# Patient Record
Sex: MALE | Race: OTHER | Employment: OTHER | URBAN - METROPOLITAN AREA
[De-identification: names, ages, dates, MRNs, and addresses within clinical notes are randomized per-mention and may not be internally consistent; named-entity substitution may affect disease eponyms.]

---

## 2022-04-14 ENCOUNTER — OFFICE VISIT (OUTPATIENT)
Dept: CARDIOLOGY CLINIC | Age: 72
End: 2022-04-14
Payer: MEDICARE

## 2022-04-14 VITALS
WEIGHT: 151 LBS | HEART RATE: 78 BPM | OXYGEN SATURATION: 98 % | DIASTOLIC BLOOD PRESSURE: 64 MMHG | BODY MASS INDEX: 24.27 KG/M2 | HEIGHT: 66 IN | SYSTOLIC BLOOD PRESSURE: 114 MMHG

## 2022-04-14 DIAGNOSIS — R94.31 ABNORMAL EKG: ICD-10-CM

## 2022-04-14 DIAGNOSIS — Z82.49 FAMILY HISTORY OF HEART DISEASE: ICD-10-CM

## 2022-04-14 DIAGNOSIS — I10 HYPERTENSION, UNSPECIFIED TYPE: Primary | ICD-10-CM

## 2022-04-14 DIAGNOSIS — R01.1 SYSTOLIC MURMUR: ICD-10-CM

## 2022-04-14 DIAGNOSIS — E11.9 CONTROLLED TYPE 2 DIABETES MELLITUS WITHOUT COMPLICATION, WITHOUT LONG-TERM CURRENT USE OF INSULIN (HCC): ICD-10-CM

## 2022-04-14 DIAGNOSIS — E78.5 HYPERLIPIDEMIA, UNSPECIFIED HYPERLIPIDEMIA TYPE: ICD-10-CM

## 2022-04-14 PROCEDURE — 99205 OFFICE O/P NEW HI 60 MIN: CPT | Performed by: INTERNAL MEDICINE

## 2022-04-14 PROCEDURE — 93000 ELECTROCARDIOGRAM COMPLETE: CPT | Performed by: INTERNAL MEDICINE

## 2022-04-14 RX ORDER — LISINOPRIL 20 MG/1
20 TABLET ORAL DAILY
COMMUNITY

## 2022-04-14 RX ORDER — LORAZEPAM 0.5 MG/1
0.5 TABLET ORAL
COMMUNITY

## 2022-04-14 RX ORDER — ROSUVASTATIN CALCIUM 20 MG/1
20 TABLET, COATED ORAL DAILY
Qty: 30 TABLET | Refills: 3
Start: 2022-04-14

## 2022-04-14 NOTE — PATIENT INSTRUCTIONS
1) Stress Test and Echocardiogram (US of the heart)  2) We will call you within 1-2 days after the testing is complete with the results  3) We will see you in follow up as needed per Dr. Lawanda Caldera instructions. Patient Education        Cardiac Perfusion Scan: About This Test  What is it? A cardiac perfusion scan measures the amount of blood in your heart muscle at rest and after your heart has been made to work hard. Medicine or exercise canbe used to increase the amount of blood that your heart needs. During the scan, a camera takes pictures of your heart after a radioactive tracer is put into a vein in your arm. The tracer travels through the blood and into your heart. As the tracer moves through your heart, areas that have good blood flow absorb the tracer. Areas that don't absorb the tracer may not be getting enough blood or may have been damaged by a heart attack. The picturesshow the difference. Two sets of pictures may be made during the test. One set is taken while you are resting. Another set is taken after your heart has been made to work harder(called a stress test). Why is this test done? The test is often done to find out what may be causing chest pain or pressure. It may be done after a heart attack to see if areas of the heart aren't getting enough blood. It also may be used to find out how much your heart has beendamaged from the heart attack. How do you prepare for the test?  Tell your doctor ALL the medicines, vitamins, supplements, and herbal remedies you take. Some may increase the risk of problems during your test. Your doctor will tell you if you should stop taking any of them before the test and howsoon to do it. If you take aspirin or some other blood thinner, ask your doctor if you should stop taking it before your test. Make sure that you understand exactly whatyour doctor wants you to do. These medicines increase the risk of bleeding.   You may be told not to eat or drink for several hours before the scan. You may be told to avoid alcohol, tobacco, and drinks that have caffeine for at least24 hours before the test.  Wear comfortable shoes, such as running shoes, and loose shorts or pants. Don'twear jewelry to the test.  If you are breastfeeding, you may want to pump enough breast milk before the test to get through 1 to 2 days of feeding. The radioactive tracer used in thistest can get into your breast milk and is not good for the baby. How is the test done? A cardiac perfusion test can be done while you're resting, after you exercise, or after you take medicine. Or you could have the test after taking medicineand exercising. Before the scans, electrodes will be attached to your chest to help record your heartbeats. You will have a tube, called an IV, put into your arm. Radioactivetracer will be put in the IV.  For the resting scan, you will lie on a table. A camera above your chest records the tracer that has moved from your blood into your heart muscle. Several scans will be taken. They take 10 to 30 minutes each.  For an exercise scan, you will walk on a treadmill or pedal a stationary bike. Then you have the scan.  For a medicine scan, you are given a medicine in your IV that increases the amount of blood that your heart needs. Then you have the scan. How long does a cardiac perfusion scan take?  Each scan may take about 30 to 60 minutes.  How long the test takes will depend on how many scans you have and how long you wait between scans. What are the risks of a cardiac perfusion scan? Cardiac perfusion scans are usually safe. Anytime you're exposed to radiation, there's a small chance of damage to cells or tissue. That's the case even with the low-level radioactive tracer used for this test. But the chance of damage is very low compared with the benefits ofthe test.  There will be some risks when the test uses exercise or medicine to stress your heart.  The amount of risk depends on the condition of your heart and yourgeneral level of health. The risks include:   Fainting.  Chest pain.  An irregular heartbeat.  Heart attack. There is a slight risk that death may result if a heart attack occurs during the test.  What happens after the test?  You can go home and back to your usual activities right away, unless you arealready admitted to the hospital.  How can you care for yourself at home?  Drink plenty of fluids for the next 24 hours to help flush the tracer out of your body. If you have kidney, heart, or liver disease and have to limit fluids, talk with your doctor before you increase the amount of fluids you drink.  Most of the tracer will leave your body through your urine or stool within a day. So be sure to flush the toilet right after you use it, and wash your hands well with soap and water. The amount of radiation in the tracer is very small. This means it isn't a risk for people to be around you after the test.   Do not breastfeed your baby for 1 or 2 days after this test. During this time, you can give your baby breast milk you stored before the test, or you can give formula. When should you call for help? Call 911 anytime you think you may need emergency care. For example, call if:   You passed out (lost consciousness).  You have been diagnosed with angina, and you have angina symptoms that do not go away with rest or are not getting better within 5 minutes after you take a dose of nitroglycerin.  You have symptoms of a heart attack. These may include:  ? Chest pain or pressure, or a strange feeling in the chest.  ? Sweating. ? Shortness of breath. ? Nausea or vomiting. ? Pain, pressure, or a strange feeling in the back, neck, jaw, or upper belly or in one or both shoulders or arms. ? Lightheadedness or sudden weakness. ? A fast or irregular heartbeat.   After you call 911, the  may tell you to chew 1 adult-strength or 2 to 4 low-dose aspirin. Wait for an ambulance. Do not try to drive yourself. Watch closely for changes in your health, and be sure to contact your doctor ifyou have any problems. Follow-up care is a key part of your treatment and safety. Be sure to make and go to all appointments, and call your doctor if you are having problems. It's also a good idea to keep a list of the medicines youtake. Ask your doctor when you can expect to have your test results. Where can you learn more? Go to [email or web address].[email or web address] org and sign in to your [employer] account. Enter T239 in the [employer] box to learn more about \"Cardiac Perfusion Scan: About This Test.\"     If you do not have an account, please click on the \"Sign Up Now\" link. Current as of: January 10, 2022               Content Version: 13.2  © 2006-2022 [employer]. Care instructions adapted under license by [facility] ([facility]). If you have questions about a medical condition or this instruction, always ask your healthcare professional. [address] any warranty or liability for your use of this information. Patient Education        Transthoracic Echocardiogram: About This Test  What is it? An echocardiogram (also called an echo) uses sound waves to make an image of your heart. A device called a transducer sends sound waves that echo off your heart and back to the transducer. These echoes are turned into moving picturesof your heart that can be seen on a video screen. In a transthoracic echocardiogram (TTE), the transducer is moved across yourchest or belly. A TTE is the most common type of echocardiogram.  Why is this test done? This test is done to check your heart health. It's used for many reasons. Forexample, it may be done to:   Check a heart murmur.  Look for the cause of shortness of breath or unexplained chest pain or pressure.  Check how well your heart is pumping blood.    Check to see how well your heart valves are working.  Look for blood clots inside your heart.  Measure the size and shape of the heart's chambers.  Measure the blood pressure and speed of blood flow through the heart. How is the test done?  You may be asked to remove your clothes above your waist. You may be given a cloth or paper covering to use during the test.   You will lie on your back or on your left side on a bed or table.  You may receive medicine through a vein (intravenously, or IV). The IV can be used to give you a contrast material. This helps your doctor get good views of your heart.  Small pads or patches (electrodes) will be placed on the skin of your chest to record your heart rate during the test.   A small amount of gel will be rubbed on the side of your chest to help  the sound waves.  The transducer will be pressed firmly against your chest and moved slowly back and forth. It is usually moved to different areas on your chest or belly to get specific views of your heart.  You will be asked to do several things, such as hold very still, breathe in and out very slowly, hold your breath, or lie on your left side.  When the test is over, the gel is wiped off and the electrodes are removed. What are the risks of the test?  There are no known risks from having this test.   No electricity passes through your body during the test. There is no danger of getting an electrical shock.  You do not receive any radiation. What happens after the test?   You will probably be able to go home right away. It depends on the reason for the test.   You can go back to your usual activities right away. Follow-up care is a key part of your treatment and safety. Be sure to make and go to all appointments, and call your doctor if you are having problems. It's also a good idea to keep a list of the medicines youtake. Ask your doctor when you can expect to have your test results. Where can you learn more?   Go to [email or web address] and sign in to your [employer] account. Enter T235 in the [place] box to learn more about \"Transthoracic Echocardiogram: About This Test.\"     If you do not have an account, please click on the \"Sign Up Now\" link. Current as of: January 10, 2022               Content Version: 13.2  © 0667-8458 Healthwise, [employer]. Care instructions adapted under license by [facility] ([facility] [place]). If you have questions about a medical condition or this instruction, always ask your healthcare professional. [place] [address] any warranty or liability for your use of this information.

## 2022-04-14 NOTE — PROGRESS NOTES
[address]      Patient Name:  Anitra Gabriel  Requesting Physician: No admitting provider for patient encounter. Primary Care Physician: [facility] MD    Reason for Consultation/Chief Complaint: Preventive cardiac evaluation    Reason for Referral: family hx of heart disease. CC: \"I have no heart symptoms but my family had heart disease. \"     History of Present Illness:    Anitra Gabriel is a 73 y.o. patient presenting today for preventive care. The patient's risk factors for cardiac disease include the following:  Hypertension  Hyperlipidemia  Diabetes mellitus  Family history of CAD     The patient is 73 y.o. male with a past medical history significant for DM, HTN, HLD with family hx of heart disease mother and brother. He is accompanied by family member today. He currently denies any cardiac sounding complaints. He is able to perform all daily activities without any limitations. He walks 10,000 steps per day, lifts weights daily, able to go up and down the stairs 10x without any symptoms with up to 2 hours of daily exercise. He reports his DM, HTN and HLD with medication compliance and tolerating. Shaaron Money He goes for routine eye exams with no reported DM, HTN complications. He denies any cramping in legs with walking or neuropathy. No hx of smoking cigarettes. Social ETOH use. No hx of surgery. No allergies. Patient denies exertional chest pain/pressure, dyspnea at rest, RANDALL, PND, orthopnea, palpitations, lightheadedness, weight changes, changes in LE edema, and syncope. The patient endorses highest level of activity as walking, weight lifting, active daily. Past Medical History:   has a past medical history of Diabetes mellitus ([address] Hyperlipidemia, and Hypertension. Surgical History:   has a past surgical history that includes Refractive surgery (1996). Social History:   reports that he has never smoked.  He has never used smokeless tobacco. He reports current alcohol use. He reports that he does not use drugs. Family History:  family history includes Diabetes in his brother and mother; Heart Disease in his mother; Heart Disease (age of onset: 72) in his brother. Current Medications:  Current Outpatient Medications on File Prior to Visit   Medication Sig Dispense Refill    lisinopril (PRINIVIL;ZESTRIL) 20 MG tablet Take 20 mg by mouth daily      LORazepam (ATIVAN) 0.5 MG tablet Take 0.5 mg by mouth nightly as needed (sleep).  metformin (GLUCOPHAGE) 1000 MG tablet Take 1 tablet by mouth 2 times daily (with meals). 60 tablet 6    glyBURIDE (DIABETA) 5 MG tablet Take 1 tablet by mouth 2 times daily (with meals). 180 tablet 3     No current facility-administered medications on file prior to visit. Allergies:  Patient has no known allergies. Review of Systems:  · Constitutional: no unanticipated weight loss. There's been no change in energy level, sleep pattern, or activity level. No fevers, chills. · Eyes: No visual changes or diplopia. No scleral icterus. · ENT: No Headaches, hearing loss or vertigo. No mouth sores or sore throat. · Cardiovascular: as reviewed in HPI  · Respiratory: No cough or wheezing, no sputum production. No hematemesis. · Gastrointestinal: No abdominal pain, appetite loss, blood in stools. No change in bowel or bladder habits. · Genitourinary: No dysuria, trouble voiding, or hematuria. · Musculoskeletal:  No gait disturbance, no joint complaints. · Integumentary: No rash or pruritis. · Neurological: No headache, diplopia, change in muscle strength, numbness or tingling. · Psychiatric: No anxiety or depression. · Endocrine: No temperature intolerance. No excessive thirst, fluid intake, or urination. No tremor. · Hematologic/Lymphatic: No abnormal bruising or bleeding, blood clots or swollen lymph nodes. · Allergic/Immunologic: No nasal congestion or hives.       Objective:  Vitals: 04/14/22 1432   BP: 114/64   Pulse: 78   SpO2: 98%   Weight: 151 lb (68.5 kg)   Height: 5' 6\" (1.676 m)      Weight: 151 lb (68.5 kg)         PHYSICAL EXAM:      General:  Alert, cooperative, no distress, appears stated age   Head:  Normocephalic, atraumatic   Eyes:  Conjunctiva/corneas clear, anicteric sclerae    Nose: Nares normal, no drainage or sinus tenderness   Throat: No abnormalities of the lips, oral mucosa or tongue. Neck: Trachea midline. Neck supple with no lymphadenopathy, thyroid not enlarged, symmetric, no tenderness/mass/nodules, no Jugular venous pressure elevation    Lungs:   Clear to auscultation bilaterally, no wheezes, no rales, no respiratory distress   Chest Wall:  No deformity or tenderness to palpation   Heart:  Regular rate and rhythm, normal S1, normal S2, soft systolic murmur, no rub, no S3/S4, PMI non-displaced. Abdomen:   Soft, non-tender, with normoactive bowel sounds. No masses, no hepatosplenomegaly   Extremities: No cyanosis, clubbing or pitting edema. Vascular: 2+ radial, 2+ brachial, femoral, 2+ dorsalis pedis and posterior tibial pulses bilaterally. Brisk carotid upstrokes without carotid bruit. Skin: Skin color, texture, turgor are normal with no rashes or ulceration. Psych: Euthymic mood, appropriate affect   Neurologic: Oriented to person, place and time. No slurred speech or facial asymmetry. No motor or sensory deficits on gross examination. Labs: Care Everywhere 6/2021      Cardiovascular Data:EKG: Personally reviewed with my interpretation: 4/14/2022 Sinus  Rhythm, -  Nonspecific T-abnormality. -samll q waves in inf leads. ~ 78 bpm    Coronary calcium score:No results found for this or any previous visit. Ankle-brachial index:No results found for this or any previous visit. Carotid ultrasound screening:No results found for this or any previous visit.     Abdominal aortic aneurysm screening: No results found for this or any previous visit. The ASCVD Risk score (Dayana Reyes., et al., 2013) failed to calculate for the following reasons:    Cannot find a previous HDL lab    Cannot find a previous total cholesterol lab    Unable to determine if patient is Non-      In adults at borderline risk (5% to <7.5% 10-year ASCVD risk) or intermediate risk (7.5% to <20% 10-year ASCVD risk), the following risk-enhancing factors and/or testing has been reviewed:        Josh et al. Circulation 2019     High-Intensity Moderate-Intensity    Percent LDL-C Reduction ³50% ³30-49%   Primary statins Atorvastatin 40-80 mg Atorvastatin 10-20 mg    Rosuvastatin 20-40 mg Rosuvastatin 5-10 mg   Secondary statins   Pravastatin 40-80 mg     Simvastatin 20-40 mg     Impression and Plan:     Preventive cardiac evaluation   Hypertension, unspecified type  -     EKG 12 lead  Hyperlipidemia, unspecified hyperlipidemia type  Controlled type 2 diabetes mellitus without complication, without long-term current use of insulin (HCC)  Family history of heart disease  Systolic murmur    Presents today to establish care. He has a past medical history significant for DM, HTN, HLD with family hx of heart disease mother and brother. He is accompanied by family member today. He currently denies any cardiac sounding complaints. He is able to perform all daily activities without any limitations. He walks 10,000 steps per day, lifts weights daily, able to go up and down the stairs 10x without any symptoms for up to 2 hours daily. He reports his DM, HTN and HLD with medication compliance and tolerating. Carmel Hilt He goes for routine eye exams with no reported DM, HTN complications. He denies any cramping in legs with walking. No hx of smoking cigarettes. Social ETOH use. No hx of surgery. No allergies. Patient denies exertional chest pain/pressure, dyspnea at rest, RANDALL, PND, orthopnea, palpitations, lightheadedness, weight changes, changes in LE edema, and syncope.  Labs completed per [facility] 12/9/2021, 6/2021 LDLc 62, H/H stable, BMP stable, A1c 8.1 12/9/21. EKG today Sinus  Rhythm, -  Nonspecific T-abnormality. -samll q waves in inf leads. ~ 78 bpm. His physical exam revealed soft systolic murmur. PLAN:    1. I would like to proceed with a treadmill perfusion myoview to rule out silent progressive atherosclerotic heart disease since he has a very strong family history, history of diabetes, hyperlipidemia and borderline hypertension  2. We will obtain, echocardiogram to assess for  to rule out structural and valvular heart disease. 3. I would like for him to continue medical therapy with Lisinopril, Crestor, metformin and Diabeta. 4. Discussed life time risk factor modifications, routine labs per PCP. 5. We encouraged modest weight loss/management through implementing appropriate dietary measures as well as initiation of a graded exercise program with the ultimate goal of 150 minutes of aerobic exercise weekly. I will address the patient's cardiac risk factors and adjusted pharmacologic treatment as needed. In addition, I have reinforced the need for patient directed risk factor modification. All questions and concerns were addressed to the patient/family. Alternatives to my treatment were discussed. Patient verbalized understanding. We will call him with all test results and plan follow up as needed accordingly. Thank you for allowing us to participate in the care of this patient. This note was scribed in the presence of Dr. Queta Mina MD by Calin Lopez RN.

## 2022-04-22 ENCOUNTER — HOSPITAL ENCOUNTER (OUTPATIENT)
Dept: NON INVASIVE DIAGNOSTICS | Age: 72
Discharge: HOME | End: 2022-04-22
Payer: MEDICARE

## 2022-04-22 ENCOUNTER — TELEPHONE (OUTPATIENT)
Dept: CARDIOLOGY CLINIC | Age: 72
End: 2022-04-22

## 2022-04-22 DIAGNOSIS — I10 HYPERTENSION, UNSPECIFIED TYPE: ICD-10-CM

## 2022-04-22 DIAGNOSIS — Z82.49 FAMILY HISTORY OF HEART DISEASE: ICD-10-CM

## 2022-04-22 DIAGNOSIS — E78.5 HYPERLIPIDEMIA, UNSPECIFIED HYPERLIPIDEMIA TYPE: ICD-10-CM

## 2022-04-22 DIAGNOSIS — R01.1 SYSTOLIC MURMUR: ICD-10-CM

## 2022-04-22 DIAGNOSIS — E11.9 CONTROLLED TYPE 2 DIABETES MELLITUS WITHOUT COMPLICATION, WITHOUT LONG-TERM CURRENT USE OF INSULIN (HCC): ICD-10-CM

## 2022-04-22 DIAGNOSIS — R94.39 ABNORMAL STRESS TEST: Primary | ICD-10-CM

## 2022-04-22 DIAGNOSIS — R94.31 ABNORMAL EKG: ICD-10-CM

## 2022-04-22 LAB
LV EF: 58 %
LV EF: 73 %
LVEF MODALITY: NORMAL
LVEF MODALITY: NORMAL

## 2022-04-22 PROCEDURE — A9502 TC99M TETROFOSMIN: HCPCS | Performed by: INTERNAL MEDICINE

## 2022-04-22 PROCEDURE — 3430000000 HC RX DIAGNOSTIC RADIOPHARMACEUTICAL: Performed by: INTERNAL MEDICINE

## 2022-04-22 PROCEDURE — 93306 TTE W/DOPPLER COMPLETE: CPT

## 2022-04-22 PROCEDURE — 78452 HT MUSCLE IMAGE SPECT MULT: CPT

## 2022-04-22 PROCEDURE — 93017 CV STRESS TEST TRACING ONLY: CPT

## 2022-04-22 RX ADMIN — TETROFOSMIN 30 MILLICURIE: 1.38 INJECTION, POWDER, LYOPHILIZED, FOR SOLUTION INTRAVENOUS at 12:53

## 2022-04-22 RX ADMIN — TETROFOSMIN 10 MILLICURIE: 1.38 INJECTION, POWDER, LYOPHILIZED, FOR SOLUTION INTRAVENOUS at 11:42

## 2022-04-22 NOTE — TELEPHONE ENCOUNTER
Pt is agreeable to date/time. Went over pre-procedure instructions. Pt v/u. Published on navigaya and e-mail to [place]. Procedure is scheduled for 4/28/22 at 8:30 am with a 7 am arrival.     The morning of your procedure you will park in the hospital parking lot and report directly to the cath lab to check in.     Pre-Procedure Instructions     You will need to fast for at least 8 hours prior to procedure. No caffeine the morning of. Hold all diabetic medications including, Metfomin. If you take Lantus/Levemir only take ½ your normal dose the evening before. All other medications can be taken in the morning with sips of water. You will need to take 325 mg aspirin the morning of. If you are currently taking 81 mg please take 4 tablets that morning. Do not use any lotions, creams or perfume the morning of procedure. Pre-procedure lab work will need to be completed 5-7 days prior to procedure. Please have a responsible adult to drive you home after procedure. We advise you have someone to stay with you for 24 hours following procedure for precautionary measures. Depending on procedure you may require an overnight stay. Cath lab will provide you with all post procedure instructions. If you have any questions regarding the procedure itself or medications, please call [phone] and ask to speak with a nurse.

## 2022-04-25 LAB
ANION GAP SERPL CALCULATED.3IONS-SCNC: 12 MMOL/L (ref 7–16)
BUN BLDV-MCNC: 11 MG/DL (ref 8–23)
CALCIUM SERPL-MCNC: 9.2 MG/DL (ref 8.8–10.4)
CHLORIDE BLD-SCNC: 105 MEQ/L (ref 98–107)
CO2: 26 MMOL/L (ref 22–29)
CREAT SERPL-MCNC: 0.87 MG/DL (ref 0.67–1.3)
EGFR (CKD-EPI): 93 ML/MIN/1.73 M2
GLUCOSE BLD-MCNC: 117 MG/DL (ref 82–100)
HCT VFR BLD CALC: 41.2 % (ref 40–51)
HEMOGLOBIN: 12.9 G/DL (ref 13.7–17.5)
MCH RBC QN AUTO: 28.5 PG (ref 26–34)
MCHC RBC AUTO-ENTMCNC: 31.3 G/DL (ref 30.7–35.5)
MCV RBC AUTO: 90.9 FL (ref 80–100)
NRBC ABSOLUTE: 0 X10(3)/MCL
NUCLEATED RED BLOOD CELLS: 0.2 %
PDW BLD-RTO: 13.2 %
PLATELET # BLD: 248 X10(3)/MCL (ref 155–369)
PMV BLD AUTO: 11.5 FL (ref 8.8–12.5)
POTASSIUM SERPL-SCNC: 4.9 MEQ/L (ref 3.5–5)
RBC # BLD: 4.53 X10(6)/MCL (ref 4.6–6.1)
SODIUM BLD-SCNC: 143 MEQ/L (ref 136–145)
WBC # BLD: 8.3 X10(3)/MCL (ref 3.7–10.3)

## 2022-04-25 NOTE — TELEPHONE ENCOUNTER
Pt is rescheduled on 4/28/22 to 10:30 with an arrival time of 9 am. Pt is agreeable to date/time. Went over pre-procedure instructions again.  Pt v/u

## 2022-04-28 ENCOUNTER — HOSPITAL ENCOUNTER (OUTPATIENT)
Dept: CARDIAC CATH/INVASIVE PROCEDURES | Age: 72
Discharge: HOME | End: 2022-04-28
Payer: MEDICARE

## 2022-04-28 VITALS
HEIGHT: 65 IN | HEART RATE: 72 BPM | TEMPERATURE: 97.2 F | SYSTOLIC BLOOD PRESSURE: 161 MMHG | DIASTOLIC BLOOD PRESSURE: 69 MMHG | WEIGHT: 149 LBS | BODY MASS INDEX: 24.83 KG/M2 | OXYGEN SATURATION: 100 % | RESPIRATION RATE: 12 BRPM

## 2022-04-28 LAB
LEFT VENTRICULAR EJECTION FRACTION MODE: NORMAL
LEFT VENTRICULAR EJECTION FRACTION MODE: NORMAL
LV EF: 60 %

## 2022-04-28 PROCEDURE — C1894 INTRO/SHEATH, NON-LASER: HCPCS

## 2022-04-28 PROCEDURE — 6360000004 HC RX CONTRAST MEDICATION: Performed by: INTERNAL MEDICINE

## 2022-04-28 PROCEDURE — 2500000003 HC RX 250 WO HCPCS

## 2022-04-28 PROCEDURE — 6370000000 HC RX 637 (ALT 250 FOR IP)

## 2022-04-28 PROCEDURE — 2580000003 HC RX 258

## 2022-04-28 PROCEDURE — 99152 MOD SED SAME PHYS/QHP 5/>YRS: CPT

## 2022-04-28 PROCEDURE — 99153 MOD SED SAME PHYS/QHP EA: CPT

## 2022-04-28 PROCEDURE — 2709999900 HC NON-CHARGEABLE SUPPLY

## 2022-04-28 PROCEDURE — 93458 L HRT ARTERY/VENTRICLE ANGIO: CPT | Performed by: INTERNAL MEDICINE

## 2022-04-28 PROCEDURE — 93458 L HRT ARTERY/VENTRICLE ANGIO: CPT

## 2022-04-28 PROCEDURE — 6360000002 HC RX W HCPCS

## 2022-04-28 PROCEDURE — C1769 GUIDE WIRE: HCPCS

## 2022-04-28 PROCEDURE — C1887 CATHETER, GUIDING: HCPCS

## 2022-04-28 PROCEDURE — 99152 MOD SED SAME PHYS/QHP 5/>YRS: CPT | Performed by: INTERNAL MEDICINE

## 2022-04-28 RX ORDER — SODIUM CHLORIDE 9 MG/ML
INJECTION, SOLUTION INTRAVENOUS PRN
Status: DISCONTINUED | OUTPATIENT
Start: 2022-04-28 | End: 2022-04-29 | Stop reason: HOSPADM

## 2022-04-28 RX ORDER — ONDANSETRON 2 MG/ML
4 INJECTION INTRAMUSCULAR; INTRAVENOUS EVERY 6 HOURS PRN
Status: DISCONTINUED | OUTPATIENT
Start: 2022-04-28 | End: 2022-04-29 | Stop reason: HOSPADM

## 2022-04-28 RX ORDER — ACETAMINOPHEN 325 MG/1
650 TABLET ORAL EVERY 4 HOURS PRN
Status: DISCONTINUED | OUTPATIENT
Start: 2022-04-28 | End: 2022-04-29 | Stop reason: HOSPADM

## 2022-04-28 RX ORDER — ASPIRIN 81 MG/1
81 TABLET ORAL DAILY
Qty: 30 TABLET | Refills: 3 | Status: SHIPPED | OUTPATIENT
Start: 2022-04-28

## 2022-04-28 RX ORDER — SODIUM CHLORIDE 0.9 % (FLUSH) 0.9 %
5-40 SYRINGE (ML) INJECTION PRN
Status: DISCONTINUED | OUTPATIENT
Start: 2022-04-28 | End: 2022-04-29 | Stop reason: HOSPADM

## 2022-04-28 RX ORDER — ASPIRIN 325 MG
325 TABLET ORAL ONCE
Status: DISCONTINUED | OUTPATIENT
Start: 2022-04-28 | End: 2022-04-29 | Stop reason: HOSPADM

## 2022-04-28 RX ORDER — SODIUM CHLORIDE 0.9 % (FLUSH) 0.9 %
5-40 SYRINGE (ML) INJECTION EVERY 12 HOURS SCHEDULED
Status: DISCONTINUED | OUTPATIENT
Start: 2022-04-28 | End: 2022-04-29 | Stop reason: HOSPADM

## 2022-04-28 RX ADMIN — IOPAMIDOL 75 ML: 755 INJECTION, SOLUTION INTRAVENOUS at 11:12

## 2022-04-28 NOTE — PROCEDURES
[address] [place] [address]                            CARDIAC CATHETERIZATION    PATIENT NAME: Tammi Luna             :        1950  MED REC NO:   [id number]                          ROOM:  ACCOUNT NO:   [de-identified]                           ADMIT DATE: 2022  PROVIDER:     Beatris Romberg, MD    DATE OF PROCEDURE:  2022    PROCEDURE NOTE:  The patient was explained benefits and risks of the  procedure. Informed consent was obtained. After performing Mauro test,  the right radial artery was punctured using Seldinger technique. A  4/5-Peruvian slender sheath was placed in the right radial artery. Coronary angiography was carried out with the help of 5-Peruvian Tiger  catheter. Multiple views of the left and the right coronary arteries  were obtained in the [place] projection with cranial and caudal  angulations. Angled pigtail catheter was used for a left  ventriculogram.  Pressure in the left ventricular cavity was obtained  before and after the left ventriculogram.  All the catheters were  removed. Coronary angiography was carefully reviewed and a wrist band  was applied for hemostasis. Estimated blood loss was less than 30 mL. HEMODYNAMIC DATA:  Please see the computerized printout. No gradient  across the aortic valve noted. CORONARY ANGIOGRAPHY:  1. Left main trunk: It arises from the left sinus of Valsalva. It  divides into a left anterior descending artery, ramus intermedius, and  left circumflex artery. Left main trunk has evidence of mild  calcification but there is no obstructive disease noted. 2.  Left anterior descending artery shows evidence of calcification in  the proximal segment. There is an eccentric nonobstructive 30% plaque  in the proximal LAD but the remainder of the LAD appears free of  atherosclerosis. 3.  Ramus intermedius:   It arises from the left main trunk. It is also  a moderate-sized artery and it is also free of any significant  atherosclerosis. 4.  Left circumflex artery arises from the left main trunk. It is a  moderate-sized artery. It gives rise to moderate-sized obtuse marginal  branch. It is free of any significant atherosclerosis. 5.  Right coronary artery: It arises from the right sinus of Valsalva. It is a moderate-sized artery. It is a dominant artery. It gives rise  to posterior descending and posterolateral branches. Right coronary  artery and its branches are free of atherosclerosis. 4.  Left ventriculogram reveals a preserved left ventricular systolic  function with estimated EF of 50% to 55%. OVERALL IMPRESSION:  1. Calcified left main trunk patent. 2.  Proximal LAD calcification with eccentric 30% LAD plaque present. 3.  Patent left circumflex artery. 4.  Patent ramus intermedius. 5.  Patent right coronary artery and its branches. 6.  Normal left ventricular systolic function with estimated EF of 50%  to 55% with normal left heart hemodynamics. In view of the above findings, we will continue the patient on the  current medicine and continue to aggressively treat his cholesterol and  work on the risk factor modifications.         Janine Lu MD    D: 04/28/2022 11:41:09       T: 04/28/2022 12:54:20     [id number]  Job#: [id number]     Doc#: [id number]    CC:  Kristina Gabriel MD       Primary Care Physician

## 2022-04-28 NOTE — H&P
History of Present Illness:     Feliberto Blackman is a 73 y.o. patient presenting today for preventive care.      The patient's risk factors for cardiac disease include the following:  Hypertension  Hyperlipidemia  Diabetes mellitus  Family history of CAD      The patient is 73 y.o. male with a past medical history significant for DM, HTN, HLD with family hx of heart disease mother and brother. He is accompanied by family member today. He currently denies any cardiac sounding complaints. He is able to perform all daily activities without any limitations. He walks 10,000 steps per day, lifts weights daily, able to go up and down the stairs 10x without any symptoms with up to 2 hours of daily exercise. He reports his DM, HTN and HLD with medication compliance and tolerating. Lois Chang He goes for routine eye exams with no reported DM, HTN complications. He denies any cramping in legs with walking or neuropathy. No hx of smoking cigarettes. Social ETOH use. No hx of surgery. No allergies. Patient denies exertional chest pain/pressure, dyspnea at rest, RANDALL, PND, orthopnea, palpitations, lightheadedness, weight changes, changes in LE edema, and syncope.        The patient endorses highest level of activity as walking, weight lifting, active daily.      Past Medical History:   has a past medical history of Diabetes mellitus ([address] Hyperlipidemia, and Hypertension.     Surgical History:   has a past surgical history that includes Refractive surgery (1996).    Social History:   reports that he has never smoked. He has never used smokeless tobacco. He reports current alcohol use.  He reports that he does not use drugs.      Family History:  family history includes Diabetes in his brother and mother; Heart Disease in his mother; Heart Disease (age of onset: 72) in his brother.     Current Medications:         Current Outpatient Medications on File Prior to Visit   Medication Sig Dispense Refill    lisinopril (PRINIVIL;ZESTRIL) 20 MG tablet Take 20 mg by mouth daily        LORazepam (ATIVAN) 0.5 MG tablet Take 0.5 mg by mouth nightly as needed (sleep).        metformin (GLUCOPHAGE) 1000 MG tablet Take 1 tablet by mouth 2 times daily (with meals). 60 tablet 6    glyBURIDE (DIABETA) 5 MG tablet Take 1 tablet by mouth 2 times daily (with meals). 180 tablet 3      No current facility-administered medications on file prior to visit.         Allergies:  Patient has no known allergies.      Review of Systems:  · Constitutional: no unanticipated weight loss. There's been no change in energy level, sleep pattern, or activity level. No fevers, chills. · Eyes: No visual changes or diplopia. No scleral icterus. · ENT: No Headaches, hearing loss or vertigo. No mouth sores or sore throat. · Cardiovascular: as reviewed in HPI  · Respiratory: No cough or wheezing, no sputum production. No hematemesis. · Gastrointestinal: No abdominal pain, appetite loss, blood in stools. No change in bowel or bladder habits. · Genitourinary: No dysuria, trouble voiding, or hematuria. · Musculoskeletal:  No gait disturbance, no joint complaints. · Integumentary: No rash or pruritis. · Neurological: No headache, diplopia, change in muscle strength, numbness or tingling. · Psychiatric: No anxiety or depression. · Endocrine: No temperature intolerance. No excessive thirst, fluid intake, or urination. No tremor. · Hematologic/Lymphatic: No abnormal bruising or bleeding, blood clots or swollen lymph nodes.   · Allergic/Immunologic: No nasal congestion or hives.        Objective:      Vitals:     04/14/22 1432   BP: 114/64   Pulse: 78   SpO2: 98%   Weight: 151 lb (68.5 kg)   Height: 5' 6\" (1.676 m)       Weight: 151 lb (68.5 kg)          PHYSICAL EXAM:       General:  Alert, cooperative, no distress, appears stated age   Head:  Normocephalic, atraumatic   Eyes:  Conjunctiva/corneas clear, anicteric sclerae    Nose: Nares normal, no drainage or sinus tenderness Throat: No abnormalities of the lips, oral mucosa or tongue. Neck: Trachea midline. Neck supple with no lymphadenopathy, thyroid not enlarged, symmetric, no tenderness/mass/nodules, no Jugular venous pressure elevation    Lungs:   Clear to auscultation bilaterally, no wheezes, no rales, no respiratory distress   Chest Wall:  No deformity or tenderness to palpation   Heart:  Regular rate and rhythm, normal S1, normal S2, soft systolic murmur, no rub, no S3/S4, PMI non-displaced. Abdomen:   Soft, non-tender, with normoactive bowel sounds. No masses, no hepatosplenomegaly   Extremities: No cyanosis, clubbing or pitting edema. Vascular: 2+ radial, 2+ brachial, femoral, 2+ dorsalis pedis and posterior tibial pulses bilaterally. Brisk carotid upstrokes without carotid bruit. Skin: Skin color, texture, turgor are normal with no rashes or ulceration. Psych: Euthymic mood, appropriate affect   Neurologic: Oriented to person, place and time. No slurred speech or facial asymmetry. No motor or sensory deficits on gross examination.          Labs: Care Everywhere 6/2021        Cardiovascular Data:EKG: Personally reviewed with my interpretation: 4/14/2022 Sinus  Rhythm, -  Nonspecific T-abnormality. -samll q waves in inf leads. ~ 78 bpm     Coronary calcium score:No results found for this or any previous visit.      Ankle-brachial index:No results found for this or any previous visit.     Carotid ultrasound screening:No results found for this or any previous visit.     Abdominal aortic aneurysm screening: No results found for this or any previous visit.        The ASCVD Risk score (Blanche Haddad, et al., 2013) failed to calculate for the following reasons:    Cannot find a previous HDL lab    Cannot find a previous total cholesterol lab    Unable to determine if patient is Non-       In adults at borderline risk (5% to <7.5% 10-year ASCVD risk) or intermediate risk (7.5% to <20% 10-year ASCVD risk), the following risk-enhancing factors and/or testing has been reviewed:          Josh et al. Circulation 2019       High-Intensity Moderate-Intensity    Percent LDL-C Reduction ³50% ³30-49%   Primary statins Atorvastatin 40-80 mg Atorvastatin 10-20 mg     Rosuvastatin 20-40 mg Rosuvastatin 5-10 mg   Secondary statins    Pravastatin 40-80 mg       Simvastatin 20-40 mg      Impression and Plan:     Preventive cardiac evaluation   Hypertension, unspecified type  -     EKG 12 lead  Hyperlipidemia, unspecified hyperlipidemia type  Controlled type 2 diabetes mellitus without complication, without long-term current use of insulin (HCC)  Family history of heart disease  Systolic murmur     Presents today to establish care. He has a past medical history significant for DM, HTN, HLD with family hx of heart disease mother and brother. He is accompanied by family member today. He currently denies any cardiac sounding complaints. He is able to perform all daily activities without any limitations. He walks 10,000 steps per day, lifts weights daily, able to go up and down the stairs 10x without any symptoms for up to 2 hours daily. He reports his DM, HTN and HLD with medication compliance and tolerating. Rawleigh Billing He goes for routine eye exams with no reported DM, HTN complications. He denies any cramping in legs with walking. No hx of smoking cigarettes. Social ETOH use. No hx of surgery. No allergies. Patient denies exertional chest pain/pressure, dyspnea at rest, RANDALL, PND, orthopnea, palpitations, lightheadedness, weight changes, changes in LE edema, and syncope. Labs completed per [facility] 12/9/2021, 6/2021 LDLc 62, H/H stable, BMP stable, A1c 8.1 12/9/21. EKG today Sinus  Rhythm, -  Nonspecific T-abnormality. -samll q waves in inf leads. ~ 78 bpm. His physical exam revealed soft systolic murmur.         PLAN:     1.  I would like to proceed with a treadmill perfusion myoview to rule out silent progressive atherosclerotic heart disease since he has a very strong family history, history of diabetes, hyperlipidemia and borderline hypertension  2. We will obtain, echocardiogram to assess for  to rule out structural and valvular heart disease. 3. I would like for him to continue medical therapy with Lisinopril, Crestor, metformin and Diabeta. 4. Discussed life time risk factor modifications, routine labs per PCP. 5. We encouraged modest weight loss/management through implementing appropriate dietary measures as well as initiation of a graded exercise program with the ultimate goal of 150 minutes of aerobic exercise weekly. Patient underwent GXT Myoview  -GXT portion of the stress test was markedly abnormal though nuclear stress test showed no obvious reversible ischemia. Since EKG portion was markedly abnormal, and with a strong family history and other risk factors, will proceed with coronary angiography with possible intervention. Benefits and risk of the procedure has been explained and patient is willing to proceed  I discussed the risks and benefits of cardiac catheterization with the patient. I also discussed the possible therapies and alternatives including medical management, angioplasty and stenting or coronary bypass surgery. The patient is amenable to undergoing the procedure and voices understanding of the risks. We will have the procedure scheduled.

## 2022-04-28 NOTE — BRIEF OP NOTE
Brief Postoperative Note    Andi Jolly  YOB: 1950  [id number]    Pre-operative Diagnosis: Abnormal stress test    Post-operative Diagnosis: Same    Procedure: LHC, coronary angiography, LV gram    Anesthesia: Moderate Sedation    Surgeons/Assistants: Merari Jordan    Estimated Blood Loss: less than 50     Complications: None    Specimens: Was Not Obtained    Findings: LMT-patent                  LAD-mild calcification with nonobstructive plaque in proximal LAD. Ramus-patent                   LCx-patent                   RCA-patent                    LV-normal  Moderate Sedation:  Start time: 10:46 AM  Stop time: 11:15 AM  2 mg versed   100 mcg fentanyl   An independent trained observer pushed medications at my direction. We monitored the patient's level of consciousness and vital signs/physiologic status throughout the procedure duration (see start and start times above).        Electronically signed by Josh Garland MD on 4/28/2022 at 11:20 AM

## 2022-04-28 NOTE — PRE SEDATION
Brief Pre-Op Note/Sedation Assessment      Andi Jolly  1950  Room/bed info not found      [id number]  10:34 AM    Planned Procedure: Cardiac Catheterization Procedure    Post Procedure Plan: Return to same level of care    Consent: I have discussed with the patient and/or the patient representative the indication, alternatives, and the possible risks and/or complications of the planned procedure and the anesthesia methods. The patient and/or patient representative appear to understand and agree to proceed. Chief Complaint: Anginal Equivalent      Indications for Cath Procedure:  Suspected CAD  Anginal Classification within 2 weeks:  No symptoms  NYHA Heart Failure Class within 2 weeks: No symptoms  Is Cath Lab Visit Valve-related?: No  Surgical Risk: Low  Functional Type: Unknown    Anti- Anginal Meds within 2 weeks:   Yes: Statin (Any)    Stress or Imaging Studies Performed:  Stress Test with SPECT Result: Positive:  inferior Risk/Extent of Ischemia:  Intermediate     Vital Signs:  BP (!) 161/69   Pulse 72   Temp 97.2 °F (36.2 °C) (Infrared)   Resp 12   Ht 5' 5\" (1.651 m)   Wt 149 lb (67.6 kg)   SpO2 100%   BMI 24.79 kg/m²     Allergies:  No Known Allergies    Past Medical History:  Past Medical History:   Diagnosis Date    Diabetes mellitus ([facility] [address]     Hyperlipidemia     Hypertension          Surgical History:  Past Surgical History:   Procedure Laterality Date   [facility]    Dr Lucero Guzman, evaulated left lazy eye. Medications:  Current Outpatient Medications   Medication Sig Dispense Refill    lisinopril (PRINIVIL;ZESTRIL) 20 MG tablet Take 20 mg by mouth daily      LORazepam (ATIVAN) 0.5 MG tablet Take 0.5 mg by mouth nightly as needed (sleep).  rosuvastatin (CRESTOR) 20 MG tablet Take 1 tablet by mouth daily 30 tablet 3    metformin (GLUCOPHAGE) 1000 MG tablet Take 1 tablet by mouth 2 times daily (with meals).  60 tablet 6    glyBURIDE (DIABETA) 5 MG tablet Take 1 tablet by mouth 2 times daily (with meals). 180 tablet 3     Current Facility-Administered Medications   Medication Dose Route Frequency Provider Last Rate Last Admin    sodium chloride flush 0.9 % injection 5-40 mL  5-40 mL IntraVENous 2 times per day Edgar Marquez MD        sodium chloride flush 0.9 % injection 5-40 mL  5-40 mL IntraVENous PRN Edgar Marquez MD        0.9 % sodium chloride infusion   IntraVENous PRN Edgar Marquez MD        aspirin tablet 325 mg  325 mg Oral Once Emigdio Mercedes MD               Pre-Sedation:    Pre-Sedation Documentation and Exam:  I have personally completed a history, physical exam & review of systems for this patient (see notes). Prior History of Anesthesia Complications:   none    Modified Mallampati:  I (soft palate, uvula, fauces, tonsillar pillars visible)    ASA Classification:  Class 3 - A patient with severe systemic disease that limits activity but is not incapacitating      Joann Scale: Activity:  2 - Able to move 4 extremities voluntarily on command  Respiration:  2 - Able to breathe deeply and cough freely  Circulation:  2 - BP+/- 20mmHg of normal  Consciousness:  2 - Fully awake  Oxygen Saturation (color):  2 - Able to maintain oxygen saturation >92% on room air    Sedation/Anesthesia Plan:  Guard the patient's safety and welfare. Minimize physical discomfort and pain. Minimize negative psychological responses to treatment by providing sedation and analgesia and maximize the potential amnesia. Patient to meet pre-procedure discharge plan.     Medication Planned:  midazolam intravenously and fentanyl intravenously    Patient is an appropriate candidate for plan of sedation: yes      Electronically signed by Frank Mark MD on 4/28/2022 at 10:34 AM

## 2023-05-02 ENCOUNTER — OFFICE VISIT (OUTPATIENT)
Dept: CARDIOLOGY CLINIC | Age: 73
End: 2023-05-02
Payer: MEDICARE

## 2023-05-02 VITALS
WEIGHT: 153 LBS | HEART RATE: 74 BPM | DIASTOLIC BLOOD PRESSURE: 74 MMHG | HEIGHT: 65 IN | SYSTOLIC BLOOD PRESSURE: 136 MMHG | OXYGEN SATURATION: 99 % | BODY MASS INDEX: 25.49 KG/M2

## 2023-05-02 DIAGNOSIS — R94.39 ABNORMAL STRESS TEST: Primary | ICD-10-CM

## 2023-05-02 PROCEDURE — G8427 DOCREV CUR MEDS BY ELIG CLIN: HCPCS | Performed by: INTERNAL MEDICINE

## 2023-05-02 PROCEDURE — 99214 OFFICE O/P EST MOD 30 MIN: CPT | Performed by: INTERNAL MEDICINE

## 2023-05-02 PROCEDURE — 1036F TOBACCO NON-USER: CPT | Performed by: INTERNAL MEDICINE

## 2023-05-02 PROCEDURE — 93000 ELECTROCARDIOGRAM COMPLETE: CPT | Performed by: INTERNAL MEDICINE

## 2023-05-02 PROCEDURE — G8419 CALC BMI OUT NRM PARAM NOF/U: HCPCS | Performed by: INTERNAL MEDICINE

## 2023-05-02 PROCEDURE — 1123F ACP DISCUSS/DSCN MKR DOCD: CPT | Performed by: INTERNAL MEDICINE

## 2023-05-02 PROCEDURE — 3017F COLORECTAL CA SCREEN DOC REV: CPT | Performed by: INTERNAL MEDICINE

## 2023-05-02 RX ORDER — INSULIN DEGLUDEC 200 U/ML
INJECTION, SOLUTION SUBCUTANEOUS
COMMUNITY
Start: 2023-03-07

## 2023-05-02 NOTE — PROGRESS NOTES
No hematemesis. Gastrointestinal: No abdominal pain, appetite loss, blood in stools. No change in bowel or bladder habits. Genitourinary: No dysuria, trouble voiding, or hematuria. Musculoskeletal:  No gait disturbance, no joint complaints. Integumentary: No rash or pruritis. Neurological: No headache, diplopia, change in muscle strength, numbness or tingling. Psychiatric: No anxiety or depression. Endocrine: No temperature intolerance. No excessive thirst, fluid intake, or urination. No tremor. Hematologic/Lymphatic: No abnormal bruising or bleeding, blood clots or swollen lymph nodes. Allergic/Immunologic: No nasal congestion or hives. Objective:  Vitals:    05/02/23 0938   BP: 136/74   Pulse: 74   SpO2: 99%   Weight: 153 lb (69.4 kg)   Height: 5' 5\" (1.651 m)      Weight: 153 lb (69.4 kg)         PHYSICAL EXAM:      General:  Alert, cooperative, no distress, appears stated age   Head:  Normocephalic, atraumatic   Eyes:  Conjunctiva/corneas clear, anicteric sclerae    Nose: Nares normal, no drainage or sinus tenderness   Throat: No abnormalities of the lips, oral mucosa or tongue. Neck: Trachea midline. Neck supple with no lymphadenopathy, thyroid not enlarged, symmetric, no tenderness/mass/nodules, no Jugular venous pressure elevation    Lungs:   Clear to auscultation bilaterally, no wheezes, no rales, no respiratory distress   Chest Wall:  No deformity or tenderness to palpation   Heart:  Regular rate and rhythm, normal S1, normal S2, soft systolic murmur, no rub, no S3/S4, PMI non-displaced. Abdomen:   Soft, non-tender, with normoactive bowel sounds. No masses, no hepatosplenomegaly   Extremities: No cyanosis, clubbing or pitting edema. Vascular: 2+ radial, 2+ brachial, femoral, 2+ dorsalis pedis and posterior tibial pulses bilaterally. Brisk carotid upstrokes without carotid bruit. Skin: Skin color, texture, turgor are normal with no rashes or ulceration.     Psych: Euthymic mood,

## 2024-04-30 NOTE — PROGRESS NOTES
[facility]   Cardiology Progress note    Reason for Referral: family hx of heart disease.    CC: \"I have no symptoms    History of Present Illness:    Andi Jolly is a 73 y.o. patient presenting today for preventive care.      The patient is 71 y.o. male with a past medical history significant for DM, HTN, HLD with family hx of heart disease mother and brother. He is accompanied by family member today. He currently denies any cardiac sounding complaints. He is able to perform all daily activities without any limitations. He walks 10,000 steps per day, lifts weights daily, able to go up and down the stairs 10x without any symptoms with up to 2 hours of daily exercise. He reports his DM, HTN and HLD with medication compliance and tolerating. . He goes for routine eye exams with no reported DM, HTN complications. He denies any cramping in legs with walking or neuropathy. No hx of smoking cigarettes. Social ETOH use. No hx of surgery. No allergies. Patient denies exertional chest pain/pressure, dyspnea at rest, RANDALL, PND, orthopnea, palpitations, lightheadedness, weight changes, changes in LE edema, and syncope.The patient endorses highest level of activity as walking, weight lifting, active daily.     5/2/2023 he is accompanied by his wife. He currently denies any cardiac symptoms. He is walking for 1 hour daily and lifting weight daily. Working to control his uncontrolled DM with his PCP with A1c 8.9. reports reduction in his BS after medications adjusted.       The patient presents for a 1 year follow up. He is accompanied by family. He walks 5 days per week, weight lifting with no symptoms. Overall he reports he is doing well and feeling well from a cardiac standpoint with no specific cardiac complaints.       Labs CE.     Past Medical History:   has a past medical history of Diabetes mellitus (HCC), Hyperlipidemia, and Hypertension.    Surgical History:   has a past surgical history that

## 2024-05-01 ENCOUNTER — OFFICE VISIT (OUTPATIENT)
Dept: CARDIOLOGY CLINIC | Age: 74
End: 2024-05-01
Payer: COMMERCIAL

## 2024-05-01 VITALS
SYSTOLIC BLOOD PRESSURE: 124 MMHG | OXYGEN SATURATION: 99 % | HEART RATE: 64 BPM | DIASTOLIC BLOOD PRESSURE: 68 MMHG | WEIGHT: 154 LBS | HEIGHT: 65 IN | BODY MASS INDEX: 25.66 KG/M2

## 2024-05-01 DIAGNOSIS — E11.8 CONTROLLED TYPE 2 DIABETES MELLITUS WITH COMPLICATION, WITHOUT LONG-TERM CURRENT USE OF INSULIN (HCC): ICD-10-CM

## 2024-05-01 DIAGNOSIS — R01.1 SYSTOLIC MURMUR: ICD-10-CM

## 2024-05-01 DIAGNOSIS — E78.5 HYPERLIPIDEMIA, UNSPECIFIED HYPERLIPIDEMIA TYPE: ICD-10-CM

## 2024-05-01 DIAGNOSIS — I25.10 CORONARY ARTERY DISEASE INVOLVING NATIVE CORONARY ARTERY OF NATIVE HEART WITHOUT ANGINA PECTORIS: Primary | ICD-10-CM

## 2024-05-01 PROCEDURE — 1123F ACP DISCUSS/DSCN MKR DOCD: CPT | Performed by: INTERNAL MEDICINE

## 2024-05-01 PROCEDURE — 93000 ELECTROCARDIOGRAM COMPLETE: CPT | Performed by: INTERNAL MEDICINE

## 2024-05-01 PROCEDURE — 99214 OFFICE O/P EST MOD 30 MIN: CPT | Performed by: INTERNAL MEDICINE

## 2024-05-01 RX ORDER — CLONAZEPAM 0.5 MG/1
0.5 TABLET ORAL NIGHTLY
COMMUNITY
Start: 2024-03-11 | End: 2024-06-09

## 2024-05-01 RX ORDER — ROSUVASTATIN CALCIUM 20 MG/1
TABLET, COATED ORAL
Qty: 135 TABLET | Refills: 3 | Status: SHIPPED | OUTPATIENT
Start: 2024-05-01

## 2025-05-15 ENCOUNTER — OFFICE VISIT (OUTPATIENT)
Dept: CARDIOLOGY CLINIC | Age: 75
End: 2025-05-15
Payer: MEDICARE

## 2025-05-15 VITALS
HEIGHT: 65 IN | HEART RATE: 70 BPM | WEIGHT: 154 LBS | OXYGEN SATURATION: 98 % | BODY MASS INDEX: 25.66 KG/M2 | DIASTOLIC BLOOD PRESSURE: 64 MMHG | SYSTOLIC BLOOD PRESSURE: 116 MMHG

## 2025-05-15 DIAGNOSIS — E78.2 MIXED HYPERLIPIDEMIA: ICD-10-CM

## 2025-05-15 DIAGNOSIS — I25.10 CORONARY ARTERY DISEASE INVOLVING NATIVE CORONARY ARTERY OF NATIVE HEART WITHOUT ANGINA PECTORIS: Primary | ICD-10-CM

## 2025-05-15 PROCEDURE — 1123F ACP DISCUSS/DSCN MKR DOCD: CPT | Performed by: INTERNAL MEDICINE

## 2025-05-15 PROCEDURE — 93000 ELECTROCARDIOGRAM COMPLETE: CPT | Performed by: INTERNAL MEDICINE

## 2025-05-15 PROCEDURE — 99214 OFFICE O/P EST MOD 30 MIN: CPT | Performed by: INTERNAL MEDICINE

## 2025-05-15 PROCEDURE — 1159F MED LIST DOCD IN RCRD: CPT | Performed by: INTERNAL MEDICINE

## 2025-05-15 NOTE — PROGRESS NOTES
\[facility]   Cardiology Progress note    Reason for Referral: family hx of heart disease.    CC: \"    History of Present Illness:    Andi Jolly is a 74 y.o. patient presenting today for preventive care.      The patient is 71 y.o. male with a past medical history significant for DM, HTN, HLD with family hx of heart disease mother and brother. He is accompanied by family member today. He currently denies any cardiac sounding complaints. He is able to perform all daily activities without any limitations. He walks 10,000 steps per day, lifts weights daily, able to go up and down the stairs 10x without any symptoms with up to 2 hours of daily exercise. He reports his DM, HTN and HLD with medication compliance and tolerating. . He goes for routine eye exams with no reported DM, HTN complications. He denies any cramping in legs with walking or neuropathy. No hx of smoking cigarettes. Social ETOH use. No hx of surgery. No allergies. Patient denies exertional chest pain/pressure, dyspnea at rest, RANDALL, PND, orthopnea, palpitations, lightheadedness, weight changes, changes in LE edema, and syncope.The patient endorses highest level of activity as walking, weight lifting, active daily.     5/2/2023 he is accompanied by his wife. He currently denies any cardiac symptoms. He is walking for 1 hour daily and lifting weight daily. Working to control his uncontrolled DM with his PCP with A1c 8.9. reports reduction in his BS after medications adjusted.     Today,         Past Medical History:   has a past medical history of Diabetes mellitus (HCC), Hyperlipidemia, and Hypertension.    Surgical History:   has a past surgical history that includes Refractive surgery (1996).     Social History:   reports that he has never smoked. He has never used smokeless tobacco. He reports current alcohol use. He reports that he does not use drugs.     Family History:  family history includes Diabetes in his brother and

## 2025-05-15 NOTE — PROGRESS NOTES
\[facility]   Cardiology Progress note    Reason for Referral: family hx of heart disease.    CC: \"\"I am feeling good\"    History of Present Illness:    Andi Jolly is a 74 y.o. patient presenting today for preventive care.      The patient is 71 y.o. male with a past medical history significant for DM, HTN, HLD with family hx of heart disease mother and brother. He is accompanied by family member today. He currently denies any cardiac sounding complaints. He is able to perform all daily activities without any limitations. He walks 10,000 steps per day, lifts weights daily, able to go up and down the stairs 10x without any symptoms with up to 2 hours of daily exercise. He reports his DM, HTN and HLD with medication compliance and tolerating. . He goes for routine eye exams with no reported DM, HTN complications. He denies any cramping in legs with walking or neuropathy. No hx of smoking cigarettes. Social ETOH use. No hx of surgery. No allergies. Patient denies exertional chest pain/pressure, dyspnea at rest, RANDALL, PND, orthopnea, palpitations, lightheadedness, weight changes, changes in LE edema, and syncope.The patient endorses highest level of activity as walking, weight lifting, active daily.     5/2/2023 he is accompanied by his wife. He currently denies any cardiac symptoms. He is walking for 1 hour daily and lifting weight daily. Working to control his uncontrolled DM with his PCP with A1c 8.9. reports reduction in his BS after medications adjusted.     Today, patient presents for follow up. Reports overall he is feeling good. Recently got back from [place], he was there with family for about a month. Patient is staying active in the gym and with walking. He denies any new cardiac complaints. Patient currently denies any weight gain, edema, palpitations, chest pain, shortness of breath, PND, dizziness, and syncope.          Past Medical History:   has a past medical history of Diabetes